# Patient Record
Sex: MALE | Race: WHITE | NOT HISPANIC OR LATINO | Employment: OTHER | URBAN - METROPOLITAN AREA
[De-identification: names, ages, dates, MRNs, and addresses within clinical notes are randomized per-mention and may not be internally consistent; named-entity substitution may affect disease eponyms.]

---

## 2024-03-13 ENCOUNTER — OFFICE VISIT (OUTPATIENT)
Dept: PRIMARY CARE | Facility: CLINIC | Age: 69
End: 2024-03-13
Payer: MEDICARE

## 2024-03-13 VITALS
HEIGHT: 72 IN | RESPIRATION RATE: 18 BRPM | WEIGHT: 247 LBS | BODY MASS INDEX: 33.46 KG/M2 | OXYGEN SATURATION: 95 % | TEMPERATURE: 97.9 F | HEART RATE: 77 BPM | DIASTOLIC BLOOD PRESSURE: 73 MMHG | SYSTOLIC BLOOD PRESSURE: 114 MMHG

## 2024-03-13 DIAGNOSIS — G89.29 LEFT FLANK PAIN, CHRONIC: ICD-10-CM

## 2024-03-13 DIAGNOSIS — N39.43 DRIBBLING OF URINE: ICD-10-CM

## 2024-03-13 DIAGNOSIS — R10.9 LEFT FLANK PAIN, CHRONIC: ICD-10-CM

## 2024-03-13 DIAGNOSIS — F10.21 HISTORY OF ALCOHOLISM (MULTI): ICD-10-CM

## 2024-03-13 DIAGNOSIS — Z00.00 ROUTINE GENERAL MEDICAL EXAMINATION AT HEALTH CARE FACILITY: Primary | ICD-10-CM

## 2024-03-13 DIAGNOSIS — R73.9 HYPERGLYCEMIA: ICD-10-CM

## 2024-03-13 DIAGNOSIS — R39.12 WEAK URINE STREAM: ICD-10-CM

## 2024-03-13 LAB
POC APPEARANCE, URINE: CLEAR
POC BILIRUBIN, URINE: NEGATIVE
POC BLOOD, URINE: NEGATIVE
POC COLOR, URINE: YELLOW
POC GLUCOSE, URINE: NEGATIVE MG/DL
POC KETONES, URINE: NEGATIVE MG/DL
POC LEUKOCYTES, URINE: NEGATIVE
POC NITRITE,URINE: NEGATIVE
POC PH, URINE: 6 PH
POC PROTEIN, URINE: NEGATIVE MG/DL
POC SPECIFIC GRAVITY, URINE: 1.01
POC UROBILINOGEN, URINE: 0.2 EU/DL

## 2024-03-13 PROCEDURE — G0439 PPPS, SUBSEQ VISIT: HCPCS | Performed by: FAMILY MEDICINE

## 2024-03-13 PROCEDURE — 99214 OFFICE O/P EST MOD 30 MIN: CPT | Performed by: FAMILY MEDICINE

## 2024-03-13 PROCEDURE — 1159F MED LIST DOCD IN RCRD: CPT | Performed by: FAMILY MEDICINE

## 2024-03-13 PROCEDURE — 1036F TOBACCO NON-USER: CPT | Performed by: FAMILY MEDICINE

## 2024-03-13 PROCEDURE — 81002 URINALYSIS NONAUTO W/O SCOPE: CPT | Performed by: FAMILY MEDICINE

## 2024-03-13 PROCEDURE — 1170F FXNL STATUS ASSESSED: CPT | Performed by: FAMILY MEDICINE

## 2024-03-13 PROCEDURE — 1160F RVW MEDS BY RX/DR IN RCRD: CPT | Performed by: FAMILY MEDICINE

## 2024-03-13 PROCEDURE — 1158F ADVNC CARE PLAN TLK DOCD: CPT | Performed by: FAMILY MEDICINE

## 2024-03-13 PROCEDURE — G0009 ADMIN PNEUMOCOCCAL VACCINE: HCPCS | Performed by: FAMILY MEDICINE

## 2024-03-13 PROCEDURE — 1123F ACP DISCUSS/DSCN MKR DOCD: CPT | Performed by: FAMILY MEDICINE

## 2024-03-13 PROCEDURE — 90677 PCV20 VACCINE IM: CPT | Performed by: FAMILY MEDICINE

## 2024-03-13 ASSESSMENT — ENCOUNTER SYMPTOMS
OCCASIONAL FEELINGS OF UNSTEADINESS: 0
LOSS OF SENSATION IN FEET: 0
SHORTNESS OF BREATH: 0
HEADACHES: 0
DEPRESSION: 0

## 2024-03-13 ASSESSMENT — PATIENT HEALTH QUESTIONNAIRE - PHQ9
1. LITTLE INTEREST OR PLEASURE IN DOING THINGS: NOT AT ALL
SUM OF ALL RESPONSES TO PHQ9 QUESTIONS 1 AND 2: 0
2. FEELING DOWN, DEPRESSED OR HOPELESS: NOT AT ALL

## 2024-03-13 ASSESSMENT — ACTIVITIES OF DAILY LIVING (ADL)
MANAGING_FINANCES: INDEPENDENT
DOING_HOUSEWORK: INDEPENDENT
TAKING_MEDICATION: INDEPENDENT
BATHING: INDEPENDENT
DRESSING: INDEPENDENT
GROCERY_SHOPPING: INDEPENDENT

## 2024-03-13 NOTE — PROGRESS NOTES
Subjective     Jann Sands Jr. is a 68 y.o. male who presents for Medicare Annual Wellness Visit Subsequent.    HPI     Pt is here today for annual well exam.   He is doing well.  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      He does report left flank and left posterior lower rib pain which has been present for approximately three months.  He denies any injury to abdomen or back.  He denies constipation, diarrhea, nausea, vomiting, hematuria.  No hx of kidney stones.  No fevers.  No urinary symptoms.     Review of Systems   Respiratory:  Negative for shortness of breath.    Cardiovascular:  Negative for chest pain.   Neurological:  Negative for headaches.       Objective     Vitals:    03/13/24 1501   BP: 114/73   BP Location: Right arm   Patient Position: Sitting   Pulse: 77   Resp: 18   Temp: 36.6 °C (97.9 °F)   TempSrc: Temporal   SpO2: 95%   Weight: 112 kg (247 lb)   Height: 1.829 m (6')        No current outpatient medications     Allergies   Allergen Reactions    Naproxen GI Upset, Nausea Only and Other     GI upset per pt        Past Surgical History:   Procedure Laterality Date    CT ANGIO CORONARY ART WITH HEARTFLOW IF SCORE >30%  2/22/2022    CT ANGIO CORONARY ART WITH HEARTFLOW IF SCORE >30% 2/22/2022    OTHER SURGICAL HISTORY  08/06/2020    Shoulder arthroscopy    OTHER SURGICAL HISTORY  08/06/2020    Lumbar discectomy    OTHER SURGICAL HISTORY  08/06/2020    Knee replacement    OTHER SURGICAL HISTORY  05/12/2022    Colonoscopy    OTHER SURGICAL HISTORY  08/31/2021    Sinus surgery        Social History     Tobacco Use    Smoking status: Former     Packs/day: 0.50     Years: 10.00     Additional pack years: 0.00     Total pack years: 5.00     Types: Cigarettes    Smokeless tobacco: Never   Vaping Use    Vaping Use: Never used   Substance Use Topics    Alcohol use: Not Currently     Comment: quit at age 28    Drug use: Never        Social History     Substance and Sexual Activity    Alcohol Use Not Currently    Comment: quit at age 28       No family history on file.     Immunization History   Administered Date(s) Administered    Pfizer Purple Cap SARS-CoV-2 02/19/2021, 03/12/2021    Pneumococcal conjugate vaccine, 20-valent (PREVNAR 20) 03/13/2024    Pneumococcal polysaccharide vaccine, 23-valent, age 2 years and older (PNEUMOVAX 23) 08/06/2020    Tdap vaccine, age 7 year and older (BOOSTRIX, ADACEL) 01/11/2018        Physical Exam  Vitals reviewed.   Constitutional:       General: He is not in acute distress.     Appearance: Normal appearance. He is well-developed. He is not ill-appearing.   HENT:      Head: Normocephalic and atraumatic.      Right Ear: Tympanic membrane, ear canal and external ear normal.      Left Ear: Tympanic membrane, ear canal and external ear normal.      Nose: Nose normal.      Mouth/Throat:      Mouth: Mucous membranes are moist.      Pharynx: No oropharyngeal exudate or posterior oropharyngeal erythema.   Eyes:      General: Lids are normal.      Conjunctiva/sclera: Conjunctivae normal.      Right eye: Right conjunctiva is not injected.      Left eye: Left conjunctiva is not injected.   Neck:      Thyroid: No thyroid mass or thyromegaly.   Cardiovascular:      Rate and Rhythm: Normal rate and regular rhythm.      Heart sounds: No murmur heard.  Pulmonary:      Effort: Pulmonary effort is normal. No respiratory distress.      Breath sounds: Normal breath sounds. No wheezing, rhonchi or rales.   Abdominal:      General: Abdomen is flat. There is no distension.      Palpations: Abdomen is soft. There is no mass.      Tenderness: There is no abdominal tenderness. There is no right CVA tenderness, left CVA tenderness or guarding.   Musculoskeletal:         General: Tenderness (ttp over left posterior lower ribs, localized) present. Normal range of motion.      Comments: No sign of soft tissue injury    Lymphadenopathy:      Cervical: No cervical adenopathy.   Skin:      General: Skin is warm and dry.      Findings: No lesion or rash.   Neurological:      Mental Status: He is alert and oriented to person, place, and time. Mental status is at baseline.   Psychiatric:         Mood and Affect: Mood normal.         Behavior: Behavior normal.         Problem List Items Addressed This Visit       History of alcoholism (CMS/HCC)    Relevant Orders    CBC and Auto Differential     Other Visit Diagnoses       Routine general medical examination at health care facility    -  Primary    Relevant Orders    Comprehensive Metabolic Panel    Lipid Panel    CBC and Auto Differential    Dribbling of urine        Relevant Orders    CBC and Auto Differential    Prostate Specific Antigen    Hyperglycemia        Relevant Orders    CBC and Auto Differential    Hemoglobin A1C    Weak urine stream        Relevant Orders    Prostate Specific Antigen    Left flank pain, chronic        Relevant Orders    POCT UA (nonautomated) manually resulted (Completed)    XR abdomen 2 views w chest 1 view            Assessment/Plan     Medicare well exam    Colon screening - Colonoscopy is up to date 5/12/22,repeat 2027     Shingrix vaccine status - declines     Pneumonia vaccine status - prevnar 20 given today     I recommend yearly flu vaccine and routine COVID vaccinations as indicated     Healthy diet, routine exercise was discussed with patient      Living will/MPOA discussed    Screening questions completed (Fall /depression/ADL/IADL/Home Safety/Functional ability)    Hx of alcoholism - remote - quit at age 28.      Left sided abdominal wall/posterior left lower rib pain x 3 months - will obtain KUB, UA dip, if symptoms persist may need CT a/p.  Go the the nearest Emergency Department if your condition worsens significantly or becomes life-threatening.        Follow up 1-2 weeks for recheck on left sided flank pain.      Go the the nearest Emergency Department if your condition worsens significantly or becomes  life-threatening.

## 2024-03-14 ENCOUNTER — LAB (OUTPATIENT)
Dept: LAB | Facility: LAB | Age: 69
End: 2024-03-14
Payer: MEDICARE

## 2024-03-14 ENCOUNTER — HOSPITAL ENCOUNTER (OUTPATIENT)
Dept: RADIOLOGY | Facility: CLINIC | Age: 69
Discharge: HOME | End: 2024-03-14
Payer: MEDICARE

## 2024-03-14 DIAGNOSIS — R73.9 HYPERGLYCEMIA: ICD-10-CM

## 2024-03-14 DIAGNOSIS — N39.43 DRIBBLING OF URINE: ICD-10-CM

## 2024-03-14 DIAGNOSIS — G89.29 LEFT FLANK PAIN, CHRONIC: ICD-10-CM

## 2024-03-14 DIAGNOSIS — F10.21 HISTORY OF ALCOHOLISM (MULTI): ICD-10-CM

## 2024-03-14 DIAGNOSIS — Z00.00 ROUTINE GENERAL MEDICAL EXAMINATION AT HEALTH CARE FACILITY: ICD-10-CM

## 2024-03-14 DIAGNOSIS — R39.12 WEAK URINE STREAM: ICD-10-CM

## 2024-03-14 DIAGNOSIS — R10.9 LEFT FLANK PAIN, CHRONIC: ICD-10-CM

## 2024-03-14 LAB
ALBUMIN SERPL BCP-MCNC: 4.1 G/DL (ref 3.4–5)
ALP SERPL-CCNC: 101 U/L (ref 33–136)
ALT SERPL W P-5'-P-CCNC: 8 U/L (ref 10–52)
ANION GAP SERPL CALC-SCNC: 12 MMOL/L (ref 10–20)
AST SERPL W P-5'-P-CCNC: 14 U/L (ref 9–39)
BASOPHILS # BLD AUTO: 0.05 X10*3/UL (ref 0–0.1)
BASOPHILS NFR BLD AUTO: 0.6 %
BILIRUB SERPL-MCNC: 0.9 MG/DL (ref 0–1.2)
BUN SERPL-MCNC: 17 MG/DL (ref 6–23)
CALCIUM SERPL-MCNC: 9.2 MG/DL (ref 8.6–10.6)
CHLORIDE SERPL-SCNC: 103 MMOL/L (ref 98–107)
CHOLEST SERPL-MCNC: 171 MG/DL (ref 0–199)
CHOLESTEROL/HDL RATIO: 3.3
CO2 SERPL-SCNC: 29 MMOL/L (ref 21–32)
CREAT SERPL-MCNC: 0.95 MG/DL (ref 0.5–1.3)
EGFRCR SERPLBLD CKD-EPI 2021: 87 ML/MIN/1.73M*2
EOSINOPHIL # BLD AUTO: 0.28 X10*3/UL (ref 0–0.7)
EOSINOPHIL NFR BLD AUTO: 3.5 %
ERYTHROCYTE [DISTWIDTH] IN BLOOD BY AUTOMATED COUNT: 13.1 % (ref 11.5–14.5)
EST. AVERAGE GLUCOSE BLD GHB EST-MCNC: 111 MG/DL
GLUCOSE SERPL-MCNC: 108 MG/DL (ref 74–99)
HBA1C MFR BLD: 5.5 %
HCT VFR BLD AUTO: 47.9 % (ref 41–52)
HDLC SERPL-MCNC: 52.2 MG/DL
HGB BLD-MCNC: 15.2 G/DL (ref 13.5–17.5)
IMM GRANULOCYTES # BLD AUTO: 0.01 X10*3/UL (ref 0–0.7)
IMM GRANULOCYTES NFR BLD AUTO: 0.1 % (ref 0–0.9)
LDLC SERPL CALC-MCNC: 108 MG/DL
LYMPHOCYTES # BLD AUTO: 1.96 X10*3/UL (ref 1.2–4.8)
LYMPHOCYTES NFR BLD AUTO: 24.3 %
MCH RBC QN AUTO: 28.4 PG (ref 26–34)
MCHC RBC AUTO-ENTMCNC: 31.7 G/DL (ref 32–36)
MCV RBC AUTO: 90 FL (ref 80–100)
MONOCYTES # BLD AUTO: 0.6 X10*3/UL (ref 0.1–1)
MONOCYTES NFR BLD AUTO: 7.4 %
NEUTROPHILS # BLD AUTO: 5.17 X10*3/UL (ref 1.2–7.7)
NEUTROPHILS NFR BLD AUTO: 64.1 %
NON HDL CHOLESTEROL: 119 MG/DL (ref 0–149)
NRBC BLD-RTO: 0 /100 WBCS (ref 0–0)
PLATELET # BLD AUTO: 169 X10*3/UL (ref 150–450)
POTASSIUM SERPL-SCNC: 4.7 MMOL/L (ref 3.5–5.3)
PROT SERPL-MCNC: 6.6 G/DL (ref 6.4–8.2)
PSA SERPL-MCNC: 0.7 NG/ML
RBC # BLD AUTO: 5.35 X10*6/UL (ref 4.5–5.9)
SODIUM SERPL-SCNC: 139 MMOL/L (ref 136–145)
TRIGL SERPL-MCNC: 52 MG/DL (ref 0–149)
VLDL: 10 MG/DL (ref 0–40)
WBC # BLD AUTO: 8.1 X10*3/UL (ref 4.4–11.3)

## 2024-03-14 PROCEDURE — 36415 COLL VENOUS BLD VENIPUNCTURE: CPT

## 2024-03-14 PROCEDURE — 83036 HEMOGLOBIN GLYCOSYLATED A1C: CPT

## 2024-03-14 PROCEDURE — 74022 RADEX COMPL AQT ABD SERIES: CPT

## 2024-03-14 PROCEDURE — 85025 COMPLETE CBC W/AUTO DIFF WBC: CPT

## 2024-03-14 PROCEDURE — 80061 LIPID PANEL: CPT

## 2024-03-14 PROCEDURE — 84153 ASSAY OF PSA TOTAL: CPT

## 2024-03-14 PROCEDURE — 80053 COMPREHEN METABOLIC PANEL: CPT

## 2024-03-18 ENCOUNTER — OFFICE VISIT (OUTPATIENT)
Dept: PRIMARY CARE | Facility: CLINIC | Age: 69
End: 2024-03-18
Payer: MEDICARE

## 2024-03-18 VITALS
BODY MASS INDEX: 33.59 KG/M2 | OXYGEN SATURATION: 97 % | DIASTOLIC BLOOD PRESSURE: 72 MMHG | HEART RATE: 64 BPM | WEIGHT: 248 LBS | RESPIRATION RATE: 18 BRPM | HEIGHT: 72 IN | TEMPERATURE: 97.9 F | SYSTOLIC BLOOD PRESSURE: 108 MMHG

## 2024-03-18 DIAGNOSIS — G89.29 LEFT FLANK PAIN, CHRONIC: Primary | ICD-10-CM

## 2024-03-18 DIAGNOSIS — R10.9 LEFT FLANK PAIN, CHRONIC: Primary | ICD-10-CM

## 2024-03-18 DIAGNOSIS — R07.81 RIB PAIN ON LEFT SIDE: ICD-10-CM

## 2024-03-18 PROCEDURE — 99214 OFFICE O/P EST MOD 30 MIN: CPT | Performed by: FAMILY MEDICINE

## 2024-03-18 PROCEDURE — 1123F ACP DISCUSS/DSCN MKR DOCD: CPT | Performed by: FAMILY MEDICINE

## 2024-03-18 PROCEDURE — 1160F RVW MEDS BY RX/DR IN RCRD: CPT | Performed by: FAMILY MEDICINE

## 2024-03-18 PROCEDURE — 1036F TOBACCO NON-USER: CPT | Performed by: FAMILY MEDICINE

## 2024-03-18 PROCEDURE — 1159F MED LIST DOCD IN RCRD: CPT | Performed by: FAMILY MEDICINE

## 2024-03-18 ASSESSMENT — ENCOUNTER SYMPTOMS
ABDOMINAL PAIN: 0
SHORTNESS OF BREATH: 0
CHILLS: 0
VOMITING: 0
HEADACHES: 0
NAUSEA: 0
FEVER: 0

## 2024-03-18 NOTE — PROGRESS NOTES
Subjective     Jann Sands Jr. is a 68 y.o. male who presents for Left Flank Pain.    HPI     Pt continues to have left flank and left posterior lower rib pain which has been present for approximately three months. He denies any injury to abdomen or back. He denies constipation, diarrhea, nausea, vomiting, hematuria. No hx of kidney stones. No fevers. No urinary symptoms.  Last week I ordered a KUB and labs which were unremarkable.  He had a colonoscopy through [facility] in 2022 - normal.  He denies hx of diverticulitis.  He does report pain in left flank region with reaching across his body or with bending forward or picking things up.  He does not take any OTC pain relief medications for the pain.  He notices the pain in the left flank daily for the past three months.      Review of Systems   Constitutional:  Negative for chills and fever.   Respiratory:  Negative for shortness of breath.    Cardiovascular:  Negative for chest pain.   Gastrointestinal:  Negative for abdominal pain, nausea and vomiting.   Neurological:  Negative for headaches.       Objective     Vitals:    03/18/24 0827   BP: 108/72   BP Location: Right arm   Patient Position: Sitting   Pulse: 64   Resp: 18   Temp: 36.6 °C (97.9 °F)   TempSrc: Temporal   SpO2: 97%   Weight: 112 kg (248 lb)   Height: 1.829 m (6')        No current outpatient medications     Allergies   Allergen Reactions    Naproxen GI Upset, Nausea Only and Other     GI upset per pt        Past Surgical History:   Procedure Laterality Date    CT ANGIO CORONARY ART WITH HEARTFLOW IF SCORE >30%  2/22/2022    CT ANGIO CORONARY ART WITH HEARTFLOW IF SCORE >30% 2/22/2022    OTHER SURGICAL HISTORY  08/06/2020    Shoulder arthroscopy    OTHER SURGICAL HISTORY  08/06/2020    Lumbar discectomy    OTHER SURGICAL HISTORY  08/06/2020    Knee replacement    OTHER SURGICAL HISTORY  05/12/2022    Colonoscopy    OTHER SURGICAL HISTORY  08/31/2021    Sinus surgery        Social History      Tobacco Use    Smoking status: Former     Packs/day: 0.50     Years: 10.00     Additional pack years: 0.00     Total pack years: 5.00     Types: Cigarettes    Smokeless tobacco: Never   Vaping Use    Vaping Use: Never used   Substance Use Topics    Alcohol use: Not Currently     Comment: quit at age 28    Drug use: Never        Social History     Substance and Sexual Activity   Alcohol Use Not Currently    Comment: quit at age 28       No family history on file.     Immunization History   Administered Date(s) Administered    Pfizer Purple Cap SARS-CoV-2 02/19/2021, 03/12/2021    Pneumococcal conjugate vaccine, 20-valent (PREVNAR 20) 03/13/2024    Pneumococcal polysaccharide vaccine, 23-valent, age 2 years and older (PNEUMOVAX 23) 08/06/2020    Tdap vaccine, age 7 year and older (BOOSTRIX, ADACEL) 01/11/2018        Physical Exam  Vitals reviewed.   Constitutional:       General: He is not in acute distress.     Appearance: Normal appearance. He is not ill-appearing.   HENT:      Head: Normocephalic and atraumatic.   Neck:      Thyroid: No thyroid mass or thyromegaly.   Cardiovascular:      Rate and Rhythm: Normal rate and regular rhythm.      Heart sounds: No murmur heard.  Pulmonary:      Effort: No respiratory distress.      Breath sounds: Normal breath sounds. No wheezing, rhonchi or rales.   Abdominal:      General: Abdomen is flat. There is no distension.      Palpations: Abdomen is soft. There is no mass.      Tenderness: There is no abdominal tenderness.   Musculoskeletal:         General: Normal range of motion.      Comments: Ttp over left posterior/lateral lower ribs   No sign of bruising or injury.     Lymphadenopathy:      Cervical: No cervical adenopathy.   Skin:     General: Skin is warm and dry.      Findings: No lesion or rash.   Neurological:      Mental Status: He is alert and oriented to person, place, and time.         Problem List Items Addressed This Visit    None  Visit Diagnoses       Left  flank pain, chronic    -  Primary    Relevant Orders    CT abdomen pelvis wo IV contrast    Referral to Orthopaedic Surgery    Referral to Physical Therapy    Rib pain on left side        Relevant Orders    Referral to Orthopaedic Surgery    Referral to Physical Therapy            Assessment/Plan     Left sided abdominal wall/posterior left lower rib pain x 3 months - reviewed KUB, UA dip and lab results which were unremarkable, possible musculoskeletal etiology vs kidney stone vs bowel concern.  I will refer to ortho and physical therapist.  If needed I will have patient complete a  CT a/p without contrast.  Pt agreeable.  Follow up 4 weeks.  Go the the nearest Emergency Department if your condition worsens significantly or becomes life-threatening.        Follow up 1 month

## 2024-03-29 ENCOUNTER — APPOINTMENT (OUTPATIENT)
Dept: PRIMARY CARE | Facility: CLINIC | Age: 69
End: 2024-03-29
Payer: MEDICARE

## 2024-04-03 ENCOUNTER — HOSPITAL ENCOUNTER (OUTPATIENT)
Dept: RADIOLOGY | Facility: CLINIC | Age: 69
Discharge: HOME | End: 2024-04-03
Payer: MEDICARE

## 2024-04-03 DIAGNOSIS — R10.9 LEFT FLANK PAIN, CHRONIC: ICD-10-CM

## 2024-04-03 DIAGNOSIS — G89.29 LEFT FLANK PAIN, CHRONIC: ICD-10-CM

## 2024-04-03 PROCEDURE — 74176 CT ABD & PELVIS W/O CONTRAST: CPT | Performed by: RADIOLOGY

## 2024-04-03 PROCEDURE — 74176 CT ABD & PELVIS W/O CONTRAST: CPT

## 2024-04-08 ENCOUNTER — TELEPHONE (OUTPATIENT)
Dept: PRIMARY CARE | Facility: CLINIC | Age: 69
End: 2024-04-08
Payer: MEDICARE

## 2024-04-30 ENCOUNTER — OFFICE VISIT (OUTPATIENT)
Dept: PRIMARY CARE | Facility: CLINIC | Age: 69
End: 2024-04-30
Payer: MEDICARE

## 2024-04-30 VITALS
BODY MASS INDEX: 33.72 KG/M2 | HEART RATE: 65 BPM | SYSTOLIC BLOOD PRESSURE: 111 MMHG | HEIGHT: 72 IN | TEMPERATURE: 97.9 F | WEIGHT: 249 LBS | OXYGEN SATURATION: 98 % | RESPIRATION RATE: 18 BRPM | DIASTOLIC BLOOD PRESSURE: 66 MMHG

## 2024-04-30 DIAGNOSIS — R10.9 FLANK PAIN: ICD-10-CM

## 2024-04-30 DIAGNOSIS — R93.1 AGATSTON CORONARY ARTERY CALCIUM SCORE LESS THAN 100: Primary | ICD-10-CM

## 2024-04-30 PROCEDURE — 1123F ACP DISCUSS/DSCN MKR DOCD: CPT | Performed by: FAMILY MEDICINE

## 2024-04-30 PROCEDURE — 1160F RVW MEDS BY RX/DR IN RCRD: CPT | Performed by: FAMILY MEDICINE

## 2024-04-30 PROCEDURE — 99213 OFFICE O/P EST LOW 20 MIN: CPT | Performed by: FAMILY MEDICINE

## 2024-04-30 PROCEDURE — 1159F MED LIST DOCD IN RCRD: CPT | Performed by: FAMILY MEDICINE

## 2024-04-30 PROCEDURE — 1036F TOBACCO NON-USER: CPT | Performed by: FAMILY MEDICINE

## 2024-04-30 ASSESSMENT — ENCOUNTER SYMPTOMS
FEVER: 0
CHILLS: 0
SHORTNESS OF BREATH: 0
HEADACHES: 0

## 2024-04-30 NOTE — PROGRESS NOTES
Subjective     Jann Sands Jr. is a 69 y.o. male who presents for Abdominal Pain.    HPI     Pt is here for follow up on his left lower flank/abdominal pain (worse with movement) which was present for four months.  At last visit he had he had CT a/p ordered which he completed - the CT a/p showed chronic back/lumbar disc disease but no acute intraabdominal pathology.  He was referred to PT and ortho but he did not schedule.  He reports his abdominal/flank pain resolved completely one week ago.  He has been golfing without any flank pain.  He feels well.  No fevers or chills. No hematuria.  No hx of kidney stones.  No known injury to flank.     Review of Systems   Constitutional:  Negative for chills and fever.   Respiratory:  Negative for shortness of breath.    Cardiovascular:  Negative for chest pain.   Neurological:  Negative for headaches.       Objective     Vitals:    04/30/24 0747   BP: 111/66   BP Location: Right arm   Patient Position: Sitting   Pulse: 65   Resp: 18   Temp: 36.6 °C (97.9 °F)   TempSrc: Temporal   SpO2: 98%   Weight: 113 kg (249 lb)   Height: 1.829 m (6')        No current outpatient medications     Allergies   Allergen Reactions    Naproxen GI Upset, Nausea Only and Other     GI upset per pt        Past Surgical History:   Procedure Laterality Date    CT ANGIO CORONARY ART WITH HEARTFLOW IF SCORE >30%  2/22/2022    CT ANGIO CORONARY ART WITH HEARTFLOW IF SCORE >30% 2/22/2022    OTHER SURGICAL HISTORY  08/06/2020    Shoulder arthroscopy    OTHER SURGICAL HISTORY  08/06/2020    Lumbar discectomy    OTHER SURGICAL HISTORY  08/06/2020    Knee replacement    OTHER SURGICAL HISTORY  05/12/2022    Colonoscopy    OTHER SURGICAL HISTORY  08/31/2021    Sinus surgery        Social History     Tobacco Use    Smoking status: Former     Current packs/day: 0.50     Average packs/day: 0.5 packs/day for 10.0 years (5.0 ttl pk-yrs)     Types: Cigarettes    Smokeless tobacco: Never   Vaping Use     Vaping status: Never Used   Substance Use Topics    Alcohol use: Not Currently     Comment: quit at age 28    Drug use: Never        Social History     Substance and Sexual Activity   Alcohol Use Not Currently    Comment: quit at age 28       No family history on file.     Immunization History   Administered Date(s) Administered    Pfizer Purple Cap SARS-CoV-2 02/19/2021, 03/12/2021    Pneumococcal conjugate vaccine, 20-valent (PREVNAR 20) 03/13/2024    Pneumococcal polysaccharide vaccine, 23-valent, age 2 years and older (PNEUMOVAX 23) 08/06/2020    Tdap vaccine, age 7 year and older (BOOSTRIX, ADACEL) 01/11/2018        Physical Exam  Vitals reviewed.   Constitutional:       General: He is not in acute distress.     Appearance: Normal appearance. He is well-developed.   HENT:      Head: Normocephalic and atraumatic.   Eyes:      General: Lids are normal.      Conjunctiva/sclera:      Right eye: Right conjunctiva is not injected.      Left eye: Left conjunctiva is not injected.   Cardiovascular:      Rate and Rhythm: Normal rate and regular rhythm.      Heart sounds: No murmur heard.  Pulmonary:      Effort: Pulmonary effort is normal. No respiratory distress.      Breath sounds: Normal breath sounds. No wheezing, rhonchi or rales.   Abdominal:      General: Abdomen is flat.      Palpations: Abdomen is soft.      Tenderness: There is no abdominal tenderness. There is no right CVA tenderness, left CVA tenderness or guarding.      Hernia: No hernia is present.   Skin:     General: Skin is warm and dry.      Findings: No rash.   Neurological:      Mental Status: He is alert and oriented to person, place, and time. Mental status is at baseline.   Psychiatric:         Mood and Affect: Mood normal.         Behavior: Behavior normal.         Problem List Items Addressed This Visit       [place] coronary artery calcium score less than 100 - Primary     Hx of mildly elevated CT calcium score of 22 (completed in 2/2022  through his [facility]).           Other Visit Diagnoses       Flank pain        left - resolved april 2024            Assessment/Plan     Left sided abdominal wall/posterior left lower rib pain x 4 months which has completely resolved as of 7-10 days ago.  Pt has been able to golf without any pain.     CT a/p reviewed.      Hx of mildly elevated CT calcium score of 22 (completed in 2/2022 through his [facility]).  Pt declines statin at this time.  Pt aware he is at increased risk of cardiac events (such as heart attack) due to mildly elevated CT calcium score and ASCVD risk score of 11.6%.       Follow up for routine medicare well exam or sooner if needed

## 2024-04-30 NOTE — ASSESSMENT & PLAN NOTE
Hx of mildly elevated CT calcium score of 22 (completed in 2/2022 through his [facility]).